# Patient Record
Sex: MALE | HISPANIC OR LATINO | Employment: FULL TIME | URBAN - METROPOLITAN AREA
[De-identification: names, ages, dates, MRNs, and addresses within clinical notes are randomized per-mention and may not be internally consistent; named-entity substitution may affect disease eponyms.]

---

## 2024-10-25 ENCOUNTER — HOSPITAL ENCOUNTER (INPATIENT)
Facility: HOSPITAL | Age: 34
LOS: 2 days | Discharge: HOME | End: 2024-10-27
Attending: INTERNAL MEDICINE | Admitting: INTERNAL MEDICINE

## 2024-10-25 ENCOUNTER — OFFICE VISIT (OUTPATIENT)
Dept: URGENT CARE | Age: 34
End: 2024-10-25

## 2024-10-25 ENCOUNTER — ANCILLARY PROCEDURE (OUTPATIENT)
Dept: URGENT CARE | Age: 34
End: 2024-10-25

## 2024-10-25 ENCOUNTER — APPOINTMENT (OUTPATIENT)
Dept: RADIOLOGY | Facility: HOSPITAL | Age: 34
End: 2024-10-25

## 2024-10-25 ENCOUNTER — APPOINTMENT (OUTPATIENT)
Dept: CARDIOLOGY | Facility: HOSPITAL | Age: 34
End: 2024-10-25

## 2024-10-25 VITALS
HEART RATE: 121 BPM | OXYGEN SATURATION: 92 % | RESPIRATION RATE: 20 BRPM | TEMPERATURE: 99.7 F | DIASTOLIC BLOOD PRESSURE: 71 MMHG | WEIGHT: 120 LBS | SYSTOLIC BLOOD PRESSURE: 98 MMHG

## 2024-10-25 DIAGNOSIS — R05.9 COUGH: ICD-10-CM

## 2024-10-25 DIAGNOSIS — J18.9 MULTIFOCAL PNEUMONIA: Primary | ICD-10-CM

## 2024-10-25 DIAGNOSIS — J18.9 PNEUMONIA OF RIGHT LOWER LOBE DUE TO INFECTIOUS ORGANISM: Primary | ICD-10-CM

## 2024-10-25 DIAGNOSIS — J96.01 ACUTE RESPIRATORY FAILURE WITH HYPOXIA (MULTI): ICD-10-CM

## 2024-10-25 DIAGNOSIS — J18.9 PNEUMONIA OF LEFT UPPER LOBE DUE TO INFECTIOUS ORGANISM: ICD-10-CM

## 2024-10-25 PROBLEM — R00.0 SINUS TACHYCARDIA: Status: ACTIVE | Noted: 2024-10-25

## 2024-10-25 LAB
ALBUMIN SERPL BCP-MCNC: 4.1 G/DL (ref 3.4–5)
ALP SERPL-CCNC: 64 U/L (ref 33–120)
ALT SERPL W P-5'-P-CCNC: 22 U/L (ref 10–52)
ANION GAP SERPL CALCULATED.3IONS-SCNC: 13 MMOL/L (ref 10–20)
AST SERPL W P-5'-P-CCNC: 19 U/L (ref 9–39)
BASOPHILS # BLD AUTO: 0.02 X10*3/UL (ref 0–0.1)
BASOPHILS NFR BLD AUTO: 0.3 %
BILIRUB SERPL-MCNC: 0.4 MG/DL (ref 0–1.2)
BUN SERPL-MCNC: 9 MG/DL (ref 6–23)
CALCIUM SERPL-MCNC: 9.1 MG/DL (ref 8.6–10.3)
CARDIAC TROPONIN I PNL SERPL HS: 3 NG/L (ref 0–20)
CARDIAC TROPONIN I PNL SERPL HS: <3 NG/L (ref 0–20)
CHLORIDE SERPL-SCNC: 99 MMOL/L (ref 98–107)
CO2 SERPL-SCNC: 27 MMOL/L (ref 21–32)
CREAT SERPL-MCNC: 0.89 MG/DL (ref 0.5–1.3)
D DIMER PPP FEU-MCNC: 0.54 MG/L FEU (ref 0.19–0.5)
EGFRCR SERPLBLD CKD-EPI 2021: >90 ML/MIN/1.73M*2
EOSINOPHIL # BLD AUTO: 0.02 X10*3/UL (ref 0–0.7)
EOSINOPHIL NFR BLD AUTO: 0.3 %
ERYTHROCYTE [DISTWIDTH] IN BLOOD BY AUTOMATED COUNT: 11.7 % (ref 11.5–14.5)
FLUAV RNA RESP QL NAA+PROBE: NOT DETECTED
FLUBV RNA RESP QL NAA+PROBE: NOT DETECTED
GLUCOSE SERPL-MCNC: 142 MG/DL (ref 74–99)
HCT VFR BLD AUTO: 41.7 % (ref 41–52)
HGB BLD-MCNC: 14.1 G/DL (ref 13.5–17.5)
IMM GRANULOCYTES # BLD AUTO: 0.02 X10*3/UL (ref 0–0.7)
IMM GRANULOCYTES NFR BLD AUTO: 0.3 % (ref 0–0.9)
LACTATE SERPL-SCNC: 1.1 MMOL/L (ref 0.4–2)
LYMPHOCYTES # BLD AUTO: 1.82 X10*3/UL (ref 1.2–4.8)
LYMPHOCYTES NFR BLD AUTO: 25.4 %
MAGNESIUM SERPL-MCNC: 1.9 MG/DL (ref 1.6–2.4)
MCH RBC QN AUTO: 29 PG (ref 26–34)
MCHC RBC AUTO-ENTMCNC: 33.8 G/DL (ref 32–36)
MCV RBC AUTO: 86 FL (ref 80–100)
MONOCYTES # BLD AUTO: 0.44 X10*3/UL (ref 0.1–1)
MONOCYTES NFR BLD AUTO: 6.1 %
NEUTROPHILS # BLD AUTO: 4.84 X10*3/UL (ref 1.2–7.7)
NEUTROPHILS NFR BLD AUTO: 67.6 %
NRBC BLD-RTO: 0 /100 WBCS (ref 0–0)
PLATELET # BLD AUTO: 286 X10*3/UL (ref 150–450)
POTASSIUM SERPL-SCNC: 3.8 MMOL/L (ref 3.5–5.3)
PROT SERPL-MCNC: 7.6 G/DL (ref 6.4–8.2)
RBC # BLD AUTO: 4.86 X10*6/UL (ref 4.5–5.9)
RSV RNA RESP QL NAA+PROBE: NOT DETECTED
SARS-COV-2 RNA RESP QL NAA+PROBE: NOT DETECTED
SODIUM SERPL-SCNC: 135 MMOL/L (ref 136–145)
WBC # BLD AUTO: 7.2 X10*3/UL (ref 4.4–11.3)

## 2024-10-25 PROCEDURE — 84484 ASSAY OF TROPONIN QUANT: CPT

## 2024-10-25 PROCEDURE — 99222 1ST HOSP IP/OBS MODERATE 55: CPT | Performed by: INTERNAL MEDICINE

## 2024-10-25 PROCEDURE — 96375 TX/PRO/DX INJ NEW DRUG ADDON: CPT

## 2024-10-25 PROCEDURE — 71046 X-RAY EXAM CHEST 2 VIEWS: CPT | Performed by: SURGERY

## 2024-10-25 PROCEDURE — 36415 COLL VENOUS BLD VENIPUNCTURE: CPT

## 2024-10-25 PROCEDURE — 83735 ASSAY OF MAGNESIUM: CPT

## 2024-10-25 PROCEDURE — 99285 EMERGENCY DEPT VISIT HI MDM: CPT | Mod: 25

## 2024-10-25 PROCEDURE — 1200000002 HC GENERAL ROOM WITH TELEMETRY DAILY

## 2024-10-25 PROCEDURE — 85300 ANTITHROMBIN III ACTIVITY: CPT

## 2024-10-25 PROCEDURE — 87040 BLOOD CULTURE FOR BACTERIA: CPT | Mod: TRILAB

## 2024-10-25 PROCEDURE — 87636 SARSCOV2 & INF A&B AMP PRB: CPT

## 2024-10-25 PROCEDURE — 85025 COMPLETE CBC W/AUTO DIFF WBC: CPT

## 2024-10-25 PROCEDURE — 2500000004 HC RX 250 GENERAL PHARMACY W/ HCPCS (ALT 636 FOR OP/ED)

## 2024-10-25 PROCEDURE — 93005 ELECTROCARDIOGRAM TRACING: CPT

## 2024-10-25 PROCEDURE — 87634 RSV DNA/RNA AMP PROBE: CPT | Performed by: INTERNAL MEDICINE

## 2024-10-25 PROCEDURE — 71275 CT ANGIOGRAPHY CHEST: CPT | Performed by: RADIOLOGY

## 2024-10-25 PROCEDURE — 2550000001 HC RX 255 CONTRASTS

## 2024-10-25 PROCEDURE — 96365 THER/PROPH/DIAG IV INF INIT: CPT

## 2024-10-25 PROCEDURE — 93010 ELECTROCARDIOGRAM REPORT: CPT | Performed by: INTERNAL MEDICINE

## 2024-10-25 PROCEDURE — 83605 ASSAY OF LACTIC ACID: CPT

## 2024-10-25 PROCEDURE — 80053 COMPREHEN METABOLIC PANEL: CPT

## 2024-10-25 PROCEDURE — 71275 CT ANGIOGRAPHY CHEST: CPT

## 2024-10-25 RX ORDER — CEFTRIAXONE 2 G/50ML
2 INJECTION, SOLUTION INTRAVENOUS ONCE
Status: COMPLETED | OUTPATIENT
Start: 2024-10-25 | End: 2024-10-25

## 2024-10-25 RX ORDER — IPRATROPIUM BROMIDE AND ALBUTEROL SULFATE 2.5; .5 MG/3ML; MG/3ML
3 SOLUTION RESPIRATORY (INHALATION) EVERY 6 HOURS PRN
Status: DISCONTINUED | OUTPATIENT
Start: 2024-10-25 | End: 2024-10-27 | Stop reason: HOSPADM

## 2024-10-25 ASSESSMENT — PAIN DESCRIPTION - PAIN TYPE: TYPE: ACUTE PAIN

## 2024-10-25 ASSESSMENT — COLUMBIA-SUICIDE SEVERITY RATING SCALE - C-SSRS
2. HAVE YOU ACTUALLY HAD ANY THOUGHTS OF KILLING YOURSELF?: NO
6. HAVE YOU EVER DONE ANYTHING, STARTED TO DO ANYTHING, OR PREPARED TO DO ANYTHING TO END YOUR LIFE?: NO
1. IN THE PAST MONTH, HAVE YOU WISHED YOU WERE DEAD OR WISHED YOU COULD GO TO SLEEP AND NOT WAKE UP?: NO

## 2024-10-25 ASSESSMENT — PAIN DESCRIPTION - LOCATION: LOCATION: CHEST

## 2024-10-25 ASSESSMENT — PAIN - FUNCTIONAL ASSESSMENT: PAIN_FUNCTIONAL_ASSESSMENT: 0-10

## 2024-10-25 ASSESSMENT — PAIN SCALES - GENERAL: PAINLEVEL_OUTOF10: 4

## 2024-10-25 NOTE — PROGRESS NOTES
Chief Complaint   Patient presents with    Cough    Fever    Diarrhea     Cough congestion feels fatigue / daughter has pneumonia        Physical Exam:       GEN: No acute distress    ENT: Bilateral TMs and canals unremarkable, sinus congestion present. Pharynx and tonsils mildly hyperemic but without exudate.     Resp: Lungs clear to auscultation bilaterally         No diagnosis found.     Medical Decision Making & Plan:     Tamanna Hatch, DO

## 2024-10-26 ENCOUNTER — APPOINTMENT (OUTPATIENT)
Dept: RADIOLOGY | Facility: HOSPITAL | Age: 34
End: 2024-10-26

## 2024-10-26 PROBLEM — R59.0 MEDIASTINAL LYMPHADENOPATHY: Status: ACTIVE | Noted: 2024-10-26

## 2024-10-26 PROBLEM — R59.0 HILAR LYMPHADENOPATHY: Status: ACTIVE | Noted: 2024-10-26

## 2024-10-26 PROBLEM — E04.1 THYROID NODULE: Status: ACTIVE | Noted: 2024-10-26

## 2024-10-26 PROBLEM — I77.810 AORTIC ECTASIA, THORACIC (CMS-HCC): Status: ACTIVE | Noted: 2024-10-26

## 2024-10-26 LAB
ALBUMIN SERPL BCP-MCNC: 3.8 G/DL (ref 3.4–5)
ALP SERPL-CCNC: 68 U/L (ref 33–120)
ALT SERPL W P-5'-P-CCNC: 34 U/L (ref 10–52)
ANION GAP SERPL CALCULATED.3IONS-SCNC: 15 MMOL/L (ref 10–20)
APPEARANCE UR: CLEAR
AST SERPL W P-5'-P-CCNC: 34 U/L (ref 9–39)
BASOPHILS # BLD AUTO: 0 X10*3/UL (ref 0–0.1)
BASOPHILS NFR BLD AUTO: 0 %
BILIRUB SERPL-MCNC: 0.3 MG/DL (ref 0–1.2)
BILIRUB UR STRIP.AUTO-MCNC: NEGATIVE MG/DL
BUN SERPL-MCNC: 11 MG/DL (ref 6–23)
CALCIUM SERPL-MCNC: 9.4 MG/DL (ref 8.6–10.3)
CHLORIDE SERPL-SCNC: 103 MMOL/L (ref 98–107)
CO2 SERPL-SCNC: 23 MMOL/L (ref 21–32)
COLOR UR: COLORLESS
CREAT SERPL-MCNC: 0.71 MG/DL (ref 0.5–1.3)
EGFRCR SERPLBLD CKD-EPI 2021: >90 ML/MIN/1.73M*2
EOSINOPHIL # BLD AUTO: 0 X10*3/UL (ref 0–0.7)
EOSINOPHIL NFR BLD AUTO: 0 %
ERYTHROCYTE [DISTWIDTH] IN BLOOD BY AUTOMATED COUNT: 11.8 % (ref 11.5–14.5)
GLUCOSE SERPL-MCNC: 164 MG/DL (ref 74–99)
GLUCOSE UR STRIP.AUTO-MCNC: NORMAL MG/DL
HCT VFR BLD AUTO: 40 % (ref 41–52)
HGB BLD-MCNC: 13.8 G/DL (ref 13.5–17.5)
IMM GRANULOCYTES # BLD AUTO: 0.01 X10*3/UL (ref 0–0.7)
IMM GRANULOCYTES NFR BLD AUTO: 0.2 % (ref 0–0.9)
KETONES UR STRIP.AUTO-MCNC: NEGATIVE MG/DL
LEUKOCYTE ESTERASE UR QL STRIP.AUTO: NEGATIVE
LYMPHOCYTES # BLD AUTO: 0.63 X10*3/UL (ref 1.2–4.8)
LYMPHOCYTES NFR BLD AUTO: 14.8 %
MCH RBC QN AUTO: 29.5 PG (ref 26–34)
MCHC RBC AUTO-ENTMCNC: 34.5 G/DL (ref 32–36)
MCV RBC AUTO: 86 FL (ref 80–100)
MONOCYTES # BLD AUTO: 0.07 X10*3/UL (ref 0.1–1)
MONOCYTES NFR BLD AUTO: 1.6 %
NEUTROPHILS # BLD AUTO: 3.54 X10*3/UL (ref 1.2–7.7)
NEUTROPHILS NFR BLD AUTO: 83.4 %
NITRITE UR QL STRIP.AUTO: NEGATIVE
NRBC BLD-RTO: 0 /100 WBCS (ref 0–0)
PH UR STRIP.AUTO: 6.5 [PH]
PLATELET # BLD AUTO: 279 X10*3/UL (ref 150–450)
POTASSIUM SERPL-SCNC: 4.4 MMOL/L (ref 3.5–5.3)
PROT SERPL-MCNC: 7.5 G/DL (ref 6.4–8.2)
PROT UR STRIP.AUTO-MCNC: NEGATIVE MG/DL
RBC # BLD AUTO: 4.68 X10*6/UL (ref 4.5–5.9)
RBC # UR STRIP.AUTO: NEGATIVE /UL
RBC MORPH BLD: NORMAL
RSV RNA RESP QL NAA+PROBE: NOT DETECTED
SODIUM SERPL-SCNC: 137 MMOL/L (ref 136–145)
SP GR UR STRIP.AUTO: 1.01
T4 FREE SERPL-MCNC: 1 NG/DL (ref 0.61–1.12)
TSH SERPL-ACNC: 0.15 MIU/L (ref 0.44–3.98)
UROBILINOGEN UR STRIP.AUTO-MCNC: NORMAL MG/DL
WBC # BLD AUTO: 4.3 X10*3/UL (ref 4.4–11.3)

## 2024-10-26 PROCEDURE — 99232 SBSQ HOSP IP/OBS MODERATE 35: CPT | Performed by: NURSE PRACTITIONER

## 2024-10-26 PROCEDURE — 80053 COMPREHEN METABOLIC PANEL: CPT | Performed by: INTERNAL MEDICINE

## 2024-10-26 PROCEDURE — 2500000001 HC RX 250 WO HCPCS SELF ADMINISTERED DRUGS (ALT 637 FOR MEDICARE OP): Performed by: INTERNAL MEDICINE

## 2024-10-26 PROCEDURE — 76536 US EXAM OF HEAD AND NECK: CPT | Performed by: RADIOLOGY

## 2024-10-26 PROCEDURE — 99232 SBSQ HOSP IP/OBS MODERATE 35: CPT | Performed by: STUDENT IN AN ORGANIZED HEALTH CARE EDUCATION/TRAINING PROGRAM

## 2024-10-26 PROCEDURE — 87634 RSV DNA/RNA AMP PROBE: CPT

## 2024-10-26 PROCEDURE — 87449 NOS EACH ORGANISM AG IA: CPT | Mod: TRILAB,WESLAB | Performed by: INTERNAL MEDICINE

## 2024-10-26 PROCEDURE — 76536 US EXAM OF HEAD AND NECK: CPT

## 2024-10-26 PROCEDURE — 92610 EVALUATE SWALLOWING FUNCTION: CPT | Mod: GN | Performed by: SPEECH-LANGUAGE PATHOLOGIST

## 2024-10-26 PROCEDURE — 84439 ASSAY OF FREE THYROXINE: CPT | Performed by: INTERNAL MEDICINE

## 2024-10-26 PROCEDURE — 87899 AGENT NOS ASSAY W/OPTIC: CPT | Mod: TRILAB,WESLAB | Performed by: INTERNAL MEDICINE

## 2024-10-26 PROCEDURE — 84443 ASSAY THYROID STIM HORMONE: CPT | Performed by: INTERNAL MEDICINE

## 2024-10-26 PROCEDURE — 2500000004 HC RX 250 GENERAL PHARMACY W/ HCPCS (ALT 636 FOR OP/ED): Performed by: INTERNAL MEDICINE

## 2024-10-26 PROCEDURE — 9420000001 HC RT PATIENT EDUCATION 5 MIN

## 2024-10-26 PROCEDURE — 81003 URINALYSIS AUTO W/O SCOPE: CPT

## 2024-10-26 PROCEDURE — 1200000002 HC GENERAL ROOM WITH TELEMETRY DAILY

## 2024-10-26 PROCEDURE — 85025 COMPLETE CBC W/AUTO DIFF WBC: CPT | Performed by: INTERNAL MEDICINE

## 2024-10-26 PROCEDURE — 87070 CULTURE OTHR SPECIMN AEROBIC: CPT | Mod: TRILAB,WESLAB | Performed by: INTERNAL MEDICINE

## 2024-10-26 RX ORDER — SODIUM CHLORIDE 9 MG/ML
100 INJECTION, SOLUTION INTRAVENOUS CONTINUOUS
Status: ACTIVE | OUTPATIENT
Start: 2024-10-26 | End: 2024-10-26

## 2024-10-26 RX ORDER — ENOXAPARIN SODIUM 100 MG/ML
40 INJECTION SUBCUTANEOUS DAILY
Status: DISCONTINUED | OUTPATIENT
Start: 2024-10-26 | End: 2024-10-27 | Stop reason: HOSPADM

## 2024-10-26 RX ORDER — ONDANSETRON HYDROCHLORIDE 2 MG/ML
4 INJECTION, SOLUTION INTRAVENOUS EVERY 8 HOURS PRN
Status: DISCONTINUED | OUTPATIENT
Start: 2024-10-26 | End: 2024-10-27 | Stop reason: HOSPADM

## 2024-10-26 RX ORDER — ACETAMINOPHEN 160 MG/5ML
650 SOLUTION ORAL EVERY 4 HOURS PRN
Status: DISCONTINUED | OUTPATIENT
Start: 2024-10-26 | End: 2024-10-27 | Stop reason: HOSPADM

## 2024-10-26 RX ORDER — GUAIFENESIN/DEXTROMETHORPHAN 100-10MG/5
5 SYRUP ORAL EVERY 4 HOURS PRN
Status: DISCONTINUED | OUTPATIENT
Start: 2024-10-26 | End: 2024-10-27 | Stop reason: HOSPADM

## 2024-10-26 RX ORDER — ONDANSETRON 4 MG/1
4 TABLET, ORALLY DISINTEGRATING ORAL EVERY 8 HOURS PRN
Status: DISCONTINUED | OUTPATIENT
Start: 2024-10-26 | End: 2024-10-27 | Stop reason: HOSPADM

## 2024-10-26 RX ORDER — GUAIFENESIN 600 MG/1
600 TABLET, EXTENDED RELEASE ORAL EVERY 12 HOURS PRN
Status: DISCONTINUED | OUTPATIENT
Start: 2024-10-26 | End: 2024-10-27 | Stop reason: HOSPADM

## 2024-10-26 RX ORDER — ACETAMINOPHEN 325 MG/1
650 TABLET ORAL EVERY 4 HOURS PRN
Status: DISCONTINUED | OUTPATIENT
Start: 2024-10-26 | End: 2024-10-27 | Stop reason: HOSPADM

## 2024-10-26 RX ORDER — FAMOTIDINE 10 MG/ML
20 INJECTION INTRAVENOUS ONCE
Status: COMPLETED | OUTPATIENT
Start: 2024-10-26 | End: 2024-10-26

## 2024-10-26 RX ORDER — POLYETHYLENE GLYCOL 3350 17 G/17G
17 POWDER, FOR SOLUTION ORAL DAILY PRN
Status: DISCONTINUED | OUTPATIENT
Start: 2024-10-26 | End: 2024-10-27 | Stop reason: HOSPADM

## 2024-10-26 RX ORDER — ACETAMINOPHEN 650 MG/1
650 SUPPOSITORY RECTAL EVERY 4 HOURS PRN
Status: DISCONTINUED | OUTPATIENT
Start: 2024-10-26 | End: 2024-10-27 | Stop reason: HOSPADM

## 2024-10-26 SDOH — SOCIAL STABILITY: SOCIAL INSECURITY
WITHIN THE LAST YEAR, HAVE YOU BEEN RAPED OR FORCED TO HAVE ANY KIND OF SEXUAL ACTIVITY BY YOUR PARTNER OR EX-PARTNER?: NO

## 2024-10-26 SDOH — ECONOMIC STABILITY: HOUSING INSECURITY: IN THE LAST 12 MONTHS, WAS THERE A TIME WHEN YOU WERE NOT ABLE TO PAY THE MORTGAGE OR RENT ON TIME?: NO

## 2024-10-26 SDOH — SOCIAL STABILITY: SOCIAL INSECURITY: ABUSE: ADULT

## 2024-10-26 SDOH — ECONOMIC STABILITY: FOOD INSECURITY: HOW HARD IS IT FOR YOU TO PAY FOR THE VERY BASICS LIKE FOOD, HOUSING, MEDICAL CARE, AND HEATING?: NOT VERY HARD

## 2024-10-26 SDOH — SOCIAL STABILITY: SOCIAL INSECURITY: WITHIN THE LAST YEAR, HAVE YOU BEEN AFRAID OF YOUR PARTNER OR EX-PARTNER?: NO

## 2024-10-26 SDOH — ECONOMIC STABILITY: FOOD INSECURITY: WITHIN THE PAST 12 MONTHS, THE FOOD YOU BOUGHT JUST DIDN'T LAST AND YOU DIDN'T HAVE MONEY TO GET MORE.: PATIENT DECLINED

## 2024-10-26 SDOH — ECONOMIC STABILITY: INCOME INSECURITY
IN THE PAST 12 MONTHS HAS THE ELECTRIC, GAS, OIL, OR WATER COMPANY THREATENED TO SHUT OFF SERVICES IN YOUR HOME?: PATIENT DECLINED

## 2024-10-26 SDOH — SOCIAL STABILITY: SOCIAL INSECURITY: WITHIN THE LAST YEAR, HAVE YOU BEEN HUMILIATED OR EMOTIONALLY ABUSED IN OTHER WAYS BY YOUR PARTNER OR EX-PARTNER?: NO

## 2024-10-26 SDOH — ECONOMIC STABILITY: HOUSING INSECURITY: AT ANY TIME IN THE PAST 12 MONTHS, WERE YOU HOMELESS OR LIVING IN A SHELTER (INCLUDING NOW)?: NO

## 2024-10-26 SDOH — SOCIAL STABILITY: SOCIAL INSECURITY
WITHIN THE LAST YEAR, HAVE YOU BEEN KICKED, HIT, SLAPPED, OR OTHERWISE PHYSICALLY HURT BY YOUR PARTNER OR EX-PARTNER?: NO

## 2024-10-26 SDOH — ECONOMIC STABILITY: TRANSPORTATION INSECURITY: IN THE PAST 12 MONTHS, HAS LACK OF TRANSPORTATION KEPT YOU FROM MEDICAL APPOINTMENTS OR FROM GETTING MEDICATIONS?: NO

## 2024-10-26 SDOH — ECONOMIC STABILITY: FOOD INSECURITY
WITHIN THE PAST 12 MONTHS, YOU WORRIED THAT YOUR FOOD WOULD RUN OUT BEFORE YOU GOT THE MONEY TO BUY MORE.: PATIENT DECLINED

## 2024-10-26 SDOH — ECONOMIC STABILITY: HOUSING INSECURITY: IN THE PAST 12 MONTHS, HOW MANY TIMES HAVE YOU MOVED WHERE YOU WERE LIVING?: 0

## 2024-10-26 SDOH — SOCIAL STABILITY: SOCIAL INSECURITY: HAVE YOU HAD THOUGHTS OF HARMING ANYONE ELSE?: NO

## 2024-10-26 ASSESSMENT — COGNITIVE AND FUNCTIONAL STATUS - GENERAL
MOBILITY SCORE: 24
DAILY ACTIVITIY SCORE: 24
DAILY ACTIVITIY SCORE: 24
PATIENT BASELINE BEDBOUND: NO
MOBILITY SCORE: 24
DAILY ACTIVITIY SCORE: 24
MOBILITY SCORE: 24

## 2024-10-26 ASSESSMENT — ENCOUNTER SYMPTOMS
ENDOCRINE NEGATIVE: 1
SHORTNESS OF BREATH: 1
ALLERGIC/IMMUNOLOGIC NEGATIVE: 1
EYES NEGATIVE: 1
COUGH: 1
PSYCHIATRIC NEGATIVE: 1
HEMATOLOGIC/LYMPHATIC NEGATIVE: 1
MUSCULOSKELETAL NEGATIVE: 1
FATIGUE: 1
WEAKNESS: 1

## 2024-10-26 ASSESSMENT — ACTIVITIES OF DAILY LIVING (ADL)
HEARING - LEFT EAR: FUNCTIONAL
DRESSING YOURSELF: INDEPENDENT
BATHING: INDEPENDENT
JUDGMENT_ADEQUATE_SAFELY_COMPLETE_DAILY_ACTIVITIES: YES
LACK_OF_TRANSPORTATION: NO
LACK_OF_TRANSPORTATION: PATIENT DECLINED
FEEDING YOURSELF: INDEPENDENT
TOILETING: INDEPENDENT
HEARING - RIGHT EAR: FUNCTIONAL
GROOMING: INDEPENDENT
WALKS IN HOME: INDEPENDENT
ADEQUATE_TO_COMPLETE_ADL: YES
PATIENT'S MEMORY ADEQUATE TO SAFELY COMPLETE DAILY ACTIVITIES?: YES

## 2024-10-26 ASSESSMENT — LIFESTYLE VARIABLES
HOW OFTEN DO YOU HAVE 6 OR MORE DRINKS ON ONE OCCASION: NEVER
HOW MANY STANDARD DRINKS CONTAINING ALCOHOL DO YOU HAVE ON A TYPICAL DAY: PATIENT DOES NOT DRINK
AUDIT-C TOTAL SCORE: 0
AUDIT-C TOTAL SCORE: 0
SKIP TO QUESTIONS 9-10: 1
HOW OFTEN DO YOU HAVE A DRINK CONTAINING ALCOHOL: NEVER

## 2024-10-26 ASSESSMENT — PAIN SCALES - GENERAL
PAINLEVEL_OUTOF10: 0 - NO PAIN

## 2024-10-26 ASSESSMENT — PATIENT HEALTH QUESTIONNAIRE - PHQ9
1. LITTLE INTEREST OR PLEASURE IN DOING THINGS: NOT AT ALL
2. FEELING DOWN, DEPRESSED OR HOPELESS: NOT AT ALL
SUM OF ALL RESPONSES TO PHQ9 QUESTIONS 1 & 2: 0

## 2024-10-26 ASSESSMENT — PAIN - FUNCTIONAL ASSESSMENT
PAIN_FUNCTIONAL_ASSESSMENT: 0-10

## 2024-10-26 NOTE — ASSESSMENT & PLAN NOTE
Likely secondary to dehydration from diarrhea, resolved status post 500 cc IV bolus in the emergency department.

## 2024-10-26 NOTE — PROGRESS NOTES
Speech-Language Pathology    Speech-Language Pathology Clinical Swallow Evaluation    Patient Name: Diogo Carrizales  MRN: [id number]  : 1990  Today's Date: 10/26/24  Start Time: 1020  Stop Time: 1042  Time Calculation (min): 22 min      ASSESSMENT  Impressions:  No clinical s/s of oropharyngeal dysphagia present at this time.       PLAN  Recommendations:  Is MBSS recommended? No; pt appears to be at or near functional baseline.  Solid consistency: Regular (IDDSI level 7)  Liquid consistency: Thin (IDDSI 0)  Medication administration: Whole, in thin liquid  Compensatory swallow strategies:  - Upright positioning for all PO intake  - Remain upright for >30 min after meals  - Slow rate of intake  - If patient is SOB, discontinue PO intake until resolved    Recommended frequency/duration:  Skilled SLP services recommended: No  Discharge recommendation: Home with no further SLP     Strengths: Cognition and Family/caregiver support  Barriers to participation in tx: N/A      SUBJECTIVE    PMHx relevant to rehab: Principal Problem:    Acute hypoxic respiratory failure (Multi)  Active Problems:    Multifocal pneumonia    Sinus tachycardia    Mediastinal lymphadenopathy    Hilar lymphadenopathy    Thyroid nodule    Aortic ectasia, thoracic (CMS-HCC)      Chief complaint: Pt was admitted on 10/25/24 due to:  Chief Complaint   Patient presents with    Shortness of Breath     Patient has been experiencing SOB for a week and couple days now, patient was feeling worse and checked into urgent care , had an SPO2 of 88% on RA.    . He was found to have Acute hypoxic respiratory failure (Multi).    Relevant imaging results:  CXR 10/25  IMPRESSION:  1. Findings of pneumonia of the right lung base as well as left upper  lung suprahilar region.    General Visit Information:  SLP Received On: 10/26/24  Patient Class: Inpatient  Living Environment: Home  Ordering Physician: Erik Aquino MD  Reason for Referral: Patient referred  "to ST for swallow evaluation due to concerns for aspiration in the setting of PNA  Prior to Session Communication: Bedside nurse    RN cleared pt to participate in session and reported no concerns regarding swallow function.    Pt is [place] speaking. Pt reported,\" Yesterday I was coughing more when I ate\". Patients family present at bedside providing translation as needed.    Date of Onset: 10/25/24  Date of Order: 10/25/24  BaseLine Diet: Reg/thin  Current Diet : Reg/thin    Status at time of evaluation:  Pain Assessment  Pain Assessment: 0-10  0-10 (Numeric) Pain Score: 0 - No pain  Pain Type: Acute pain  Pain Location: Chest    Pt was alert, pleasant, and cooperative for session.  Orientation: Ox4  Ability to follow functional commands: WFL  Nutritional status: Appears well-nourished/no concerns    Respiratory status: Supplemental oxygen via NC  Baseline Vocal Quality: Normal  Volitional Cough: Strong  Volitional Swallow: Within Functional Limits  Patient positioning: Upright in bed      OBJECTIVE  Clinical swallow evaluation completed and consisted of interview, OME, CNE, and diagnostic PO trials (thin liquids (3 oz water successively swallowed via cup) and 1 feliz cracker).    OME and CNE grossly WFL. Natural dentition in good condition. Scattered scabbed sores around mouth.  Oral mucosa were pink, moist, and free of obvious lesions.  Vocal quality and cough strength perceptually WFL. Timely and functional mastication, bolus manipulation and oral clearance. Laryngeal elevation was visualized and/or palpated across all trials, however adequacy of hyolaryngeal elevation/excursion cannot be determined at bedside. No immediate or delayed s/s of aspiration observed throughout all trials/consistencies presented. No clinical s/s of oropharyngeal dysphagia present at this time.     Was 3oz challenge administered: Yes; pt drank 3oz of thin liquid in one attempt, without breaking, with no overt s/sx " aspiration/penetration observed.      Treatment/Education:  Results and recommendations were relayed to: Patient, Family, and Bedside nurse  Education provided: Yes   Learner: Patient and Family   Barriers to learning: None   Method of teaching: Verbal   Topic: role of ST, results of assessment, and recommended safe swallow strategies   Outcome of teaching: Pt/family demonstrated good understanding  Treatment provided: No

## 2024-10-26 NOTE — CARE PLAN
Problem: Safety - Adult  Goal: Free from fall injury  Outcome: Progressing     Problem: Discharge Planning  Goal: Discharge to home or other facility with appropriate resources  Outcome: Progressing     Problem: Chronic Conditions and Co-morbidities  Goal: Patient's chronic conditions and co-morbidity symptoms are monitored and maintained or improved  Outcome: Progressing     Problem: Pain  Goal: Takes deep breaths with improved pain control throughout the shift  Outcome: Progressing  Goal: Turns in bed with improved pain control throughout the shift  Outcome: Progressing  Goal: Walks with improved pain control throughout the shift  Outcome: Progressing  Goal: Performs ADL's with improved pain control throughout shift  Outcome: Progressing  Goal: Participates in PT with improved pain control throughout the shift  Outcome: Progressing  Goal: Free from opioid side effects throughout the shift  Outcome: Progressing  Goal: Free from acute confusion related to pain meds throughout the shift  Outcome: Progressing   The patient's goals for the shift include Rest    The clinical goals for the shift include Monitor oxygen demand, monitor vitals, monitor labs    Over the shift, the patient did not make progress toward the following goals. Barriers to progression include . Recommendations to address these barriers include .

## 2024-10-26 NOTE — CARE PLAN
Problem: Safety - Adult  Goal: Free from fall injury  Outcome: Progressing     Problem: Discharge Planning  Goal: Discharge to home or other facility with appropriate resources  Outcome: Progressing     Problem: Chronic Conditions and Co-morbidities  Goal: Patient's chronic conditions and co-morbidity symptoms are monitored and maintained or improved  Outcome: Progressing     Problem: Pain  Goal: Takes deep breaths with improved pain control throughout the shift  Outcome: Progressing  Goal: Turns in bed with improved pain control throughout the shift  Outcome: Progressing  Goal: Walks with improved pain control throughout the shift  Outcome: Progressing  Goal: Performs ADL's with improved pain control throughout shift  Outcome: Progressing  Goal: Free from opioid side effects throughout the shift  Outcome: Progressing  Goal: Free from acute confusion related to pain meds throughout the shift  Outcome: Progressing   The patient's goals for the shift include Rest    The clinical goals for the shift include Monitor oxygen demand, monitor vitals, monitor labs      10/26/24 at 2:48 AM - Marylin Stephenson RN

## 2024-10-26 NOTE — H&P
History Of Present Illness      Diogo Carrizales is a 34 y.o. male presenting with initially to the urgent care facility with chief complaint of cough and associated fever and diarrhea.  Patient stated that he felt fatigued.  He stated that his daughter has pneumonia.  Patient subsequently presented to [facility] ED with chief complaint of shortness of breath.    Is been experiencing symptoms for over 1 week now.  At the urgent care facility his pulse oximetry was noted as 88% on room air.    Feels as if his heart is racing. Patient denies fevers, chills, cough, sore throat, runny nose, chest pain, abdominal pain, nausea, vomiting, diarrhea or urinary complaints.     Upon arrival to our hospital his vital signs were noted for Tmax 37.9, pulse rate 128, respiratory rate 24, /85.  Patient was saturating 97% on supplemental oxygen by nasal cannula at 2 to 3 L.    In the ED the patient was administered empiric IV antibiotics.  He was given IV azithromycin and IV Rocephin.  He was given a 500 cc normal saline bolus.    His ED diagnostic workup was noted for a completely normal CBC with differential.  Blood chemistry was essentially unremarkable aside from a glucose of 142 and a sodium level of 135.  Cardiac enzyme level was obtained and was undetectable.  X 2.  Actiq acid level was normal at 1.1.  His electrolyte levels were within normal limits.  D-dimer was elevated at 0.54.  Blood culture was obtained in the ED.  Patient's rapid influenza/RSV and coronavirus PCR testing were pan negative.    Patient underwent a chest x-ray two-view in the ED. this was read as findings of pneumonia of the right lung base as well as the left upper lung suprahilar region.    A CT angiogram of the chest with IV contrast was obtained.  The results are noted for the following:      IMPRESSION:  1. No central pulmonary emboli. Subsegmental pulmonary arteries are  suboptimally evaluated to suboptimal opacification.  2. Extensive  consolidative and peribronchovascular ground-glass  opacities most pronounced within the right greater than left lung  bases and to a lesser degree the left-greater-than-right upper lobes.  The findings are concerning for multifocal pneumonia. Endobronchial  debris raises the possibility of aspiration.  3. Mediastinal and hilar lymph node enlargement, likely reactive.  4. 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  5.  1.7 cm right thyroid nodule. Follow-up thyroid ultrasound is  recommended.         Past Medical History      None       Surgical History        No past surgical history on file.       Social History    He reports that he has never smoked. He has never used smokeless tobacco. He reports that he does not use drugs. No history on file for alcohol use.    He drinks beer socially       Family History      Mother: Alzheimer's dementia  Father: CAD       Allergies      Patient has no known allergies.        Review of Systems    14-point ROS otherwise negative, as per HPI/Interval History.    General: No change in weight. No weakenss/Fatigue. No Fevers/Chills/Night Sweats   Skin: No skin/hair/nail changes. No rashes or sores.  Head:  No trauma. No Headache/nasuea/vomitting.   Eyes: No visual changes. No tearing. No itching.   Ears: No hearing loss. No tinnitus. No vertigo. No discharge.  Nose, Sinuses: No rhinorrhea, No nasal congestion. No epistaxis.  Mouth, Throat, Neck: No bleeding gums, hoarseness, sore throat or swollen neck  Cardiac: No palpitations. No SOLORIO. No PND. No Orthopnea.   Respiratory: YEs Shortness of Breath. No wheezing. No cough. No hemoptysis.   GI: No nausea/vomiting. No indigestion. YEs diarrhea. No constipation.   Extremities: No numbness or tingling. No paresthesias.   Urinary: No change in urinary frequency. No change in hesitancy. No hematuria. No incontinence.         Physical Exam        Constitutional:  Pleasant. Dehydrated.   Eyes: PERRL, EOMI,   ENMT: mucous  "membranes moist  Head/Neck: Neck supple, No JVD,   Respiratory/Thorax: Patent airways, CTAB,   Cardiovascular: Sinus tachycardia, no murmurs  Gastrointestinal: Soft, non-distended, +BS.  Musculoskeletal: ROM intact, no joint swelling, normal strength  Extremities: peripheral pulses intact; no edema  Neurological: Alert and Oriented x 3; no focal deficits; gross motor and sensation intact; CN II-XII intact. No asterixis.  Psychological: Appropriate mood and behavior  Skin: No lesions, No rashes.         Last Recorded Vitals  Blood pressure 123/81, pulse (!) 101, temperature 37.9 °C (100.2 °F), temperature source Temporal, resp. rate (!) 24, height 1.753 m (5' 9\"), weight 54.4 kg (119 lb 14.9 oz), SpO2 97%.    Relevant Results    Lab Results   Component Value Date    WBC 7.2 10/25/2024    HGB 14.1 10/25/2024    HCT 41.7 10/25/2024    MCV 86 10/25/2024     10/25/2024       Lab Results   Component Value Date    GLUCOSE 142 (H) 10/25/2024    CALCIUM 9.1 10/25/2024     (L) 10/25/2024    K 3.8 10/25/2024    CO2 27 10/25/2024    CL 99 10/25/2024    BUN 9 10/25/2024    CREATININE 0.89 10/25/2024       No results found for: \"HGBA1C\"      No CT head results found for the past 12 months      Scheduled medications  doxycycline, 100 mg, intravenous, q12h  methylPREDNISolone sodium succinate (PF), 40 mg, intravenous, q12h  piperacillin-tazobactam, 3.375 g, intravenous, q6h      Continuous medications  sodium chloride 0.9%, 100 mL/hr      PRN medications  PRN medications: acetaminophen **OR** acetaminophen **OR** acetaminophen, benzocaine-menthol, dextromethorphan-guaifenesin, guaiFENesin, ipratropium-albuteroL, ondansetron ODT **OR** ondansetron, polyethylene glycol        Assessment/Plan   Principal Problem:    Acute hypoxic respiratory failure (Multi)  Active Problems:    Multifocal pneumonia    Sinus tachycardia    Mediastinal lymphadenopathy    Hilar lymphadenopathy    Thyroid nodule    Aortic ectasia, thoracic " (CMS-[facility])          Diogo Carrizales is a 34 y.o. male presenting with initially to the urgent care facility with chief complaint of cough and associated fever and diarrhea.  Patient stated that he felt fatigued.  He stated that his daughter has pneumonia.          Acute Hypoxic Respiratory Failure    Admit to telemetry unit  Vital signs per protocol  Following the setting of bilateral multifocal pneumonia  Continue empiric IV antibiotics  Follow-up sputum and blood cultures  DuoNebs as needed  Low-dose IV corticosteroids to a hasten recovery  Pulmonary consultation requested  Will add on RSV rapid PCR  Follow-up streptococcal urine antigen and Legionella urine antigen levels  CT angiogram of the chest appreciated  Repeat imaging as outpatient to assess for resolution      Bilateral Multifocal Pneumonia    Management as above      Sinus Tachycardia    Most probably secondary to above and in the setting of dehydration      Mediastinal/Hilar Lymphadenopathy    Was probably reactive to above  Repeat imaging as outpatient to assess for resolution  Defer to PCP      Incidental Thyroid Nodule    Follow-up TSH level in the a.m.  Will obtain a thyroid ultrasound  Patient may require referral to endocrinology at discharge      Incidental Ectasia of Ascending Thoracic Aorta    Patient to obtain CT surveillance imaging as outpatient  Defer to patient's PCP      DVT prophylaxis    Lovenox subcu            The patient has been Instructed by me upon admission to follow-up with their PCP upon discharge to obtain repeat blood work/CXR and to maintain close medical follow-up for their current disease process.          This Dictation was Transcribed using a Nuance Dragon Voice Recognition System Device (with Compatible Computer + Software) and as such may contain Grammatical Errors and Unintentional Typing Misprints.      I spent 35 minutes in the professional and overall care of this patient.      Erik Aquino MD

## 2024-10-26 NOTE — ASSESSMENT & PLAN NOTE
-Secondary to bilateral multifocal pneumonia,   -Continue oxygen via nasal cannula currently on 2.5 L  -Sinew IV antibiotics and IV corticosteroids  -Pulmonary medicine consulted

## 2024-10-26 NOTE — PROGRESS NOTES
Chief Complaint   Patient presents with    Cough    Fever    Diarrhea     Cough congestion feels fatigue / daughter has pneumonia        Physical Exam:     GEN: No acute distress    ENT: Bilateral TMs and canals unremarkable, sinus congestion present. Pharynx and tonsils mildly hyperemic but without exudate.     Resp: Lungs clear to auscultation bilaterally       Imaging:       === 10/25/24 ===    XR CHEST 2 VIEWS    - Impression -  1. Findings of pneumonia of the right lung base as well as left upper  lung suprahilar region.    MACRO:  None.    Signed by: Chantal Sterling 10/25/2024 8:08 PM  Dictation workstation:   [id number]    Assessment:     Encounter Diagnoses   Name Primary?    Pneumonia of right lower lobe due to infectious organism Yes    Pneumonia of left upper lobe due to infectious organism           Medical Decision Making & Plan:   Patient with O2 saturation of 88% after DuoNeb, patient with increased work of breathing. The sqad was called to transfer to [facility].       Tamanna Hatch, DO

## 2024-10-26 NOTE — PROGRESS NOTES
Diogo Carrizales is a 34 y.o. male on day 1 of admission presenting with Acute hypoxic respiratory failure (Multi).      Subjective   10/26/2024, patient seen and evaluated bedside.  Wife present during evaluation.  Patient reports improved shortness of breath.  He did complain of some chest discomfort but his troponin levels were okay.  EKG was okay.  He reports he is a non-smoker and works as a .       Objective     Last Recorded Vitals  /63 (BP Location: Right arm, Patient Position: Lying)   Pulse 90   Temp 36.2 °C (97.2 °F) (Temporal)   Resp 18   Wt 54.4 kg (119 lb 14.9 oz)   SpO2 95%   Intake/Output last 3 Shifts:    Intake/Output Summary (Last 24 hours) at 10/26/2024 1039  Last data filed at 10/26/2024 0706  Gross per 24 hour   Intake 1233.34 ml   Output --   Net 1233.34 ml       Admission Weight  Weight: 54.4 kg (119 lb 14.9 oz) (10/25/24 2015)    Daily Weight  10/25/24 : 54.4 kg (119 lb 14.9 oz)    Image Results  CT angio chest for pulmonary embolism  Narrative: Interpreted By:  Andrew Leos,   STUDY:  CT ANGIO CHEST FOR PULMONARY EMBOLISM;  10/25/2024 10:29 pm      INDICATION:  Signs/Symptoms:tachycardia, hypoxia, elevated dimer.      COMPARISON:  And a left hilar lymph node measures 1.3 cm short axis.      ACCESSION NUMBER(S):  [id number]      ORDERING CLINICIAN:  SAVANNAH ARIAS      TECHNIQUE:  Helical data acquisition of the chest was obtained after the  administration of intravenous contrast, 75 mL Omnipaque 350. Images  were reformatted in axial, coronal, and sagittal planes.  MIP  reconstructions were performed on a separate workstation and provided  for interpretation.      FINDINGS:  LOWER NECK AND CHEST WALL:  1.7 cm right thyroid nodule. Mild  gynecomastia.      MEDIASTINUM/TAINA:Mediastinal and hilar lymph node enlargement, for  example a left lower paratracheal lymph node measures 1.5 cm short  axis, and a right hilar lymph node measures 1.3 cm short  axis.  Esophagus is within normal limits.      CARDIOVASCULAR:  Cardiac chamber size within normal limits. No  pericardial effusion. Ectasia of the ascending thoracic aorta  measuring 4 cm. Normal caliber of main pulmonary artery.No central  pulmonary emboli. Subsegmental pulmonary arteries are suboptimally  evaluated to suboptimal opacification.      LUNGS, AIRWAYS, AND PLEURA:  Extensive consolidative and  peribronchovascular ground-glass opacities most pronounced within the  right greater than left lung bases and to a lesser degree the  left-greater-than-right upper lobes. Scattered endobronchial  material. Motion artifact limits evaluation of the lungs. The trachea  and central airways are patent.      MUSCULOSKELETAL: No acute osseous abnormality or suspicious osseous  lesions.      UPPER ABDOMEN: Unremarkable.      Impression: 1. No central pulmonary emboli. Subsegmental pulmonary arteries are  suboptimally evaluated to suboptimal opacification.  2. Extensive consolidative and peribronchovascular ground-glass  opacities most pronounced within the right greater than left lung  bases and to a lesser degree the left-greater-than-right upper lobes.  The findings are concerning for multifocal pneumonia. Endobronchial  debris raises the possibility of aspiration.  3. Mediastinal and hilar lymph node enlargement, likely reactive.  4. 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  5.  1.7 cm right thyroid nodule. Follow-up thyroid ultrasound is  recommended.      Signed by: Andrew Leos 10/25/2024 11:36 PM  Dictation workstation:   [id number]  XR chest 2 views  Narrative: Interpreted By:  Chantal Sterling,   STUDY:  Chest, 2 views.      INDICATION:  Signs/Symptoms:cough, fever.      COMPARISON:  None.      ACCESSION NUMBER(S):  [id number]      ORDERING CLINICIAN:  JAIDEN HECK      FINDINGS:  The cardiomediastinal silhouette size is within normal limits.      Patchy opacities are visualized in the  right lower lung zone as well  as in the left upper lung zone in the suprahilar region.      No sizeable pleural effusion.      Impression: 1. Findings of pneumonia of the right lung base as well as left upper  lung suprahilar region.      MACRO:  None.      Signed by: Chantal Sterling 10/25/2024 8:08 PM  Dictation workstation:   [id number]      Physical Exam  Constitutional:       Appearance: Normal appearance.   HENT:      Head: Normocephalic and atraumatic.      Nose: Nose normal.      Mouth/Throat:      Mouth: Mucous membranes are moist.   Eyes:      Extraocular Movements: Extraocular movements intact.   Cardiovascular:      Rate and Rhythm: Normal rate and regular rhythm.      Pulses: Normal pulses.      Heart sounds: No murmur heard.     No gallop.   Pulmonary:      Effort: Pulmonary effort is normal. No respiratory distress.      Breath sounds: No wheezing or rales.      Comments: Decreased lung sounds on the right lower lung field.  Abdominal:      General: Bowel sounds are normal. There is no distension.      Palpations: Abdomen is soft.      Tenderness: There is no abdominal tenderness. There is no guarding.   Musculoskeletal:      Cervical back: Normal range of motion and neck supple.      Right lower leg: No edema.      Left lower leg: No edema.   Skin:     General: Skin is warm and dry.   Neurological:      General: No focal deficit present.      Mental Status: He is alert and oriented to person, place, and time. Mental status is at baseline.      Motor: No weakness.   Psychiatric:         Mood and Affect: Mood normal.         Behavior: Behavior normal.         Relevant Results  Scheduled medications  doxycycline, 100 mg, intravenous, q12h  enoxaparin, 40 mg, subcutaneous, Daily  methylPREDNISolone sodium succinate (PF), 40 mg, intravenous, q12h  piperacillin-tazobactam, 3.375 g, intravenous, q6h      Continuous medications  sodium chloride 0.9%, 100 mL/hr, Last Rate: 100 mL/hr (10/26/24 0706)      PRN  medications  PRN medications: acetaminophen **OR** acetaminophen **OR** acetaminophen, benzocaine-menthol, dextromethorphan-guaifenesin, guaiFENesin, ipratropium-albuteroL, ondansetron ODT **OR** ondansetron, polyethylene glycol  Results for orders placed or performed during the hospital encounter of 10/25/24 (from the past 24 hours)   CBC and Auto Differential   Result Value Ref Range    WBC 7.2 4.4 - 11.3 x10*3/uL    nRBC 0.0 0.0 - 0.0 /100 WBCs    RBC 4.86 4.50 - 5.90 x10*6/uL    Hemoglobin 14.1 13.5 - 17.5 g/dL    Hematocrit 41.7 41.0 - 52.0 %    MCV 86 80 - 100 fL    MCH 29.0 26.0 - 34.0 pg    MCHC 33.8 32.0 - 36.0 g/dL    RDW 11.7 11.5 - 14.5 %    Platelets 286 150 - 450 x10*3/uL    Neutrophils % 67.6 40.0 - 80.0 %    Immature Granulocytes %, Automated 0.3 0.0 - 0.9 %    Lymphocytes % 25.4 13.0 - 44.0 %    Monocytes % 6.1 2.0 - 10.0 %    Eosinophils % 0.3 0.0 - 6.0 %    Basophils % 0.3 0.0 - 2.0 %    Neutrophils Absolute 4.84 1.20 - 7.70 x10*3/uL    Immature Granulocytes Absolute, Automated 0.02 0.00 - 0.70 x10*3/uL    Lymphocytes Absolute 1.82 1.20 - 4.80 x10*3/uL    Monocytes Absolute 0.44 0.10 - 1.00 x10*3/uL    Eosinophils Absolute 0.02 0.00 - 0.70 x10*3/uL    Basophils Absolute 0.02 0.00 - 0.10 x10*3/uL   Comprehensive Metabolic Panel   Result Value Ref Range    Glucose 142 (H) 74 - 99 mg/dL    Sodium 135 (L) 136 - 145 mmol/L    Potassium 3.8 3.5 - 5.3 mmol/L    Chloride 99 98 - 107 mmol/L    Bicarbonate 27 21 - 32 mmol/L    Anion Gap 13 10 - 20 mmol/L    Urea Nitrogen 9 6 - 23 mg/dL    Creatinine 0.89 0.50 - 1.30 mg/dL    eGFR >90 >60 mL/min/1.73m*2    Calcium 9.1 8.6 - 10.3 mg/dL    Albumin 4.1 3.4 - 5.0 g/dL    Alkaline Phosphatase 64 33 - 120 U/L    Total Protein 7.6 6.4 - 8.2 g/dL    AST 19 9 - 39 U/L    Bilirubin, Total 0.4 0.0 - 1.2 mg/dL    ALT 22 10 - 52 U/L   Magnesium   Result Value Ref Range    Magnesium 1.90 1.60 - 2.40 mg/dL   D-dimer, quantitative   Result Value Ref Range    D-Dimer Non  VTE, Quant (mg/L FEU) 0.54 (H) 0.19 - 0.50 mg/L FEU   Troponin I, High Sensitivity, Initial   Result Value Ref Range    Troponin I, High Sensitivity 3 0 - 20 ng/L   Sars-CoV-2 PCR   Result Value Ref Range    Coronavirus 2019, PCR Not Detected Not Detected   Influenza A, and B PCR   Result Value Ref Range    Flu A Result Not Detected Not Detected    Flu B Result Not Detected Not Detected   RSV PCR   Result Value Ref Range    RSV PCR Not Detected Not Detected   Lactate   Result Value Ref Range    Lactate 1.1 0.4 - 2.0 mmol/L   Troponin, High Sensitivity, 1 Hour   Result Value Ref Range    Troponin I, High Sensitivity <3 0 - 20 ng/L   CBC and Auto Differential   Result Value Ref Range    WBC 4.3 (L) 4.4 - 11.3 x10*3/uL    nRBC 0.0 0.0 - 0.0 /100 WBCs    RBC 4.68 4.50 - 5.90 x10*6/uL    Hemoglobin 13.8 13.5 - 17.5 g/dL    Hematocrit 40.0 (L) 41.0 - 52.0 %    MCV 86 80 - 100 fL    MCH 29.5 26.0 - 34.0 pg    MCHC 34.5 32.0 - 36.0 g/dL    RDW 11.8 11.5 - 14.5 %    Platelets 279 150 - 450 x10*3/uL    Neutrophils % 83.4 40.0 - 80.0 %    Immature Granulocytes %, Automated 0.2 0.0 - 0.9 %    Lymphocytes % 14.8 13.0 - 44.0 %    Monocytes % 1.6 2.0 - 10.0 %    Eosinophils % 0.0 0.0 - 6.0 %    Basophils % 0.0 0.0 - 2.0 %    Neutrophils Absolute 3.54 1.20 - 7.70 x10*3/uL    Immature Granulocytes Absolute, Automated 0.01 0.00 - 0.70 x10*3/uL    Lymphocytes Absolute 0.63 (L) 1.20 - 4.80 x10*3/uL    Monocytes Absolute 0.07 (L) 0.10 - 1.00 x10*3/uL    Eosinophils Absolute 0.00 0.00 - 0.70 x10*3/uL    Basophils Absolute 0.00 0.00 - 0.10 x10*3/uL   Comprehensive metabolic panel   Result Value Ref Range    Glucose 164 (H) 74 - 99 mg/dL    Sodium 137 136 - 145 mmol/L    Potassium 4.4 3.5 - 5.3 mmol/L    Chloride 103 98 - 107 mmol/L    Bicarbonate 23 21 - 32 mmol/L    Anion Gap 15 10 - 20 mmol/L    Urea Nitrogen 11 6 - 23 mg/dL    Creatinine 0.71 0.50 - 1.30 mg/dL    eGFR >90 >60 mL/min/1.73m*2    Calcium 9.4 8.6 - 10.3 mg/dL    Albumin  3.8 3.4 - 5.0 g/dL    Alkaline Phosphatase 68 33 - 120 U/L    Total Protein 7.5 6.4 - 8.2 g/dL    AST 34 9 - 39 U/L    Bilirubin, Total 0.3 0.0 - 1.2 mg/dL    ALT 34 10 - 52 U/L   TSH with reflex to Free T4 if abnormal   Result Value Ref Range    Thyroid Stimulating Hormone 0.15 (L) 0.44 - 3.98 mIU/L   Thyroxine, Free   Result Value Ref Range    Thyroxine, Free 1.00 0.61 - 1.12 ng/dL   Morphology   Result Value Ref Range    RBC Morphology No significant RBC morphology present    CT angio chest for pulmonary embolism    Result Date: 10/25/2024  Interpreted By:  Andrew Leos, STUDY: CT ANGIO CHEST FOR PULMONARY EMBOLISM;  10/25/2024 10:29 pm   INDICATION: Signs/Symptoms:tachycardia, hypoxia, elevated dimer.   COMPARISON: And a left hilar lymph node measures 1.3 cm short axis.   ACCESSION NUMBER(S): [id number]   ORDERING CLINICIAN: SAVANNAH ARIAS   TECHNIQUE: Helical data acquisition of the chest was obtained after the administration of intravenous contrast, 75 mL Omnipaque 350. Images were reformatted in axial, coronal, and sagittal planes.  MIP reconstructions were performed on a separate workstation and provided for interpretation.   FINDINGS: LOWER NECK AND CHEST WALL:  1.7 cm right thyroid nodule. Mild gynecomastia.   MEDIASTINUM/TAINA:Mediastinal and hilar lymph node enlargement, for example a left lower paratracheal lymph node measures 1.5 cm short axis, and a right hilar lymph node measures 1.3 cm short axis. Esophagus is within normal limits.   CARDIOVASCULAR:  Cardiac chamber size within normal limits. No pericardial effusion. 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 Normal caliber of main pulmonary artery.No central pulmonary emboli. Subsegmental pulmonary arteries are suboptimally evaluated to suboptimal opacification.   LUNGS, AIRWAYS, AND PLEURA:  Extensive consolidative and peribronchovascular ground-glass opacities most pronounced within the right greater than left lung bases  and to a lesser degree the left-greater-than-right upper lobes. Scattered endobronchial material. Motion artifact limits evaluation of the lungs. The trachea and central airways are patent.   MUSCULOSKELETAL: No acute osseous abnormality or suspicious osseous lesions.   UPPER ABDOMEN: Unremarkable.       1. No central pulmonary emboli. Subsegmental pulmonary arteries are suboptimally evaluated to suboptimal opacification. 2. Extensive consolidative and peribronchovascular ground-glass opacities most pronounced within the right greater than left lung bases and to a lesser degree the left-greater-than-right upper lobes. The findings are concerning for multifocal pneumonia. Endobronchial debris raises the possibility of aspiration. 3. Mediastinal and hilar lymph node enlargement, likely reactive. 4. 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 5.  1.7 cm right thyroid nodule. Follow-up thyroid ultrasound is recommended.   Signed by: Andrew Leos 10/25/2024 11:36 PM Dictation workstation:   [id number]    XR chest 2 views    Result Date: 10/25/2024  Interpreted By:  Chantal Sterling, STUDY: Chest, 2 views.   INDICATION: Signs/Symptoms:cough, fever.   COMPARISON: None.   ACCESSION NUMBER(S): [id number]   ORDERING CLINICIAN: JAIDEN HECK   FINDINGS: The cardiomediastinal silhouette size is within normal limits.   Patchy opacities are visualized in the right lower lung zone as well as in the left upper lung zone in the suprahilar region.   No sizeable pleural effusion.       1. Findings of pneumonia of the right lung base as well as left upper lung suprahilar region.   MACRO: None.   Signed by: Chantal Sterling 10/25/2024 8:08 PM Dictation workstation:   [id number]        Assessment/Plan        Assessment & Plan  Acute hypoxic respiratory failure (Multi)  -Secondary to bilateral multifocal pneumonia,   -Continue oxygen via nasal cannula currently on 2.5 L  -Sinew IV antibiotics and IV  corticosteroids  -Pulmonary medicine consulted  Multifocal pneumonia  Hypoxic with SOB on presentation, flu A, flu B, RSV, COVID-negative, chest x-ray with lower lung field and left lung pneumonia.  CTA chest negative for PE but concerns for multifocal pneumonia, mediastinal and hilar lymph node likely reactive.  -Continue IV antibiotics status post azithromycin and Rocephin in the ED, currently on Doxy and Zosyn  -Follow sputum and blood cultures  -Bronchodilators/DuoNebs as needed  -Continue low-dose IV corticosteroids  -Medicine consult pending  -Pneumonia labs pending  Sinus tachycardia  Likely secondary to dehydration from diarrhea, resolved status post 500 cc IV bolus in the emergency department.  Mediastinal lymphadenopathy  Likely reactive secondary to above, follow-up with PCP to repeat imaging.  Hilar lymphadenopathy  Same as above  Thyroid nodule  Incidental finding 1.7 cm thyroid nodule.  TSH 0.15, free T41.0  Thyroid ultrasound completed, patient should have 1 year follow-up ultrasound  Aortic ectasia, thoracic (CMS-HCC)  Incidental finding on CT imaging, follow-up outpatient with PCP    DVT prophylaxis: Lovenox subcu    Disposition: Discharge home pending clinical improvement.  Also pending pulmonary medicine consult, evaluation of final recommendation.    I spent 35 minutes in the professional in overall care of this patient.    Jose Bates MD

## 2024-10-26 NOTE — ASSESSMENT & PLAN NOTE
Incidental finding 1.7 cm thyroid nodule.  TSH 0.15, free T41.0  Thyroid ultrasound completed, patient should have 1 year follow-up ultrasound

## 2024-10-26 NOTE — CARE PLAN
The patient's goals for the shift include Rest    The clinical goals for the shift include monitor o2

## 2024-10-26 NOTE — ASSESSMENT & PLAN NOTE
Hypoxic with SOB on presentation, flu A, flu B, RSV, COVID-negative, chest x-ray with lower lung field and left lung pneumonia.  CTA chest negative for PE but concerns for multifocal pneumonia, mediastinal and hilar lymph node likely reactive.  -Continue IV antibiotics status post azithromycin and Rocephin in the ED, currently on Doxy and Zosyn  -Follow sputum and blood cultures  -Bronchodilators/DuoNebs as needed  -Continue low-dose IV corticosteroids  -Medicine consult pending  -Pneumonia labs pending

## 2024-10-26 NOTE — ED PROVIDER NOTES
HPI   Chief Complaint   Patient presents with    Shortness of Breath     Patient has been experiencing SOB for a week and couple days now, patient was feeling worse and checked into urgent care , had an SPO2 of 88% on RA.        Patient is a 34-year-old male with no significant past med history presenting with shortness of breath x 1 week via EMS.  Reportedly patient went to the urgent care clinic shortness of breath and was found to be hypoxic on room air.  Patient states that he has been having intermittent symptoms of shortness of breath and states yesterday he was feeling improved but today felt significantly worse.  Denies any sick contacts.  Feels as if his heart is racing.  Patient denies fevers, chills, cough, sore throat, runny nose, chest pain, abdominal pain, nausea, vomiting, diarrhea or urinary complaints.              Patient History   No past medical history on file.  No past surgical history on file.  No family history on file.  Social History     Tobacco Use    Smoking status: Never    Smokeless tobacco: Never   Substance Use Topics    Alcohol use: Not on file    Drug use: Never       Physical Exam   ED Triage Vitals [10/25/24 2015]   Temperature Heart Rate Respirations BP   37.9 °C (100.2 °F) (!) 128 (!) 24 137/85      Pulse Ox Temp Source Heart Rate Source Patient Position   97 % Temporal Monitor Lying      BP Location FiO2 (%)     Left arm --       Physical Exam  Vitals and nursing note reviewed.   Constitutional:       Appearance: He is well-developed.      Comments: Awake, sitting in examination bed   HENT:      Head: Normocephalic and atraumatic.      Mouth/Throat:      Mouth: Mucous membranes are moist.      Pharynx: Oropharynx is clear.   Eyes:      Extraocular Movements: Extraocular movements intact.      Conjunctiva/sclera: Conjunctivae normal.      Pupils: Pupils are equal, round, and reactive to light.   Cardiovascular:      Rate and Rhythm: Regular rhythm. Tachycardia present.       Heart sounds: No murmur heard.  Pulmonary:      Effort: Pulmonary effort is normal. No respiratory distress.      Breath sounds: Normal breath sounds. No decreased breath sounds or wheezing.   Chest:      Chest wall: No tenderness.   Abdominal:      Palpations: Abdomen is soft.      Tenderness: There is no abdominal tenderness.   Musculoskeletal:         General: No swelling. Normal range of motion.      Cervical back: Normal range of motion and neck supple.   Skin:     General: Skin is warm and dry.      Capillary Refill: Capillary refill takes less than 2 seconds.   Neurological:      General: No focal deficit present.      Mental Status: He is alert and oriented to person, place, and time.   Psychiatric:         Mood and Affect: Mood normal.         Behavior: Behavior normal.           ED Course & MDM   ED Course as of 10/25/24 2354   Fri Oct 25, 2024   2052 EKG interpreted by myself independently, EKG shows sinus tachycardia, rate 100 beats minute, parable 150, QRS 90, , QTc 415, patient has no ST elevations or depressions, negative for acute MI [MARITZA]   2102 Chest x-ray done in urgent care, shows pneumonia.  No need to repeat at this time. [AR]      ED Course User Index  [AR] Richard Deras PA-C  [MARITZA] Hernan Morton,          Diagnoses as of 10/25/24 2354   Multifocal pneumonia   Acute respiratory failure with hypoxia (Multi)                 No data recorded     [place] Coma Scale Score: 15 (10/25/24 2047 : Wayne Perales, ALDA)                           Medical Decision Making  Patient is a 34-year-old male with no significant past medical history presenting with shortness of breath x 1 week via EMS.  Patient currently on 2 L nasal cannula.  Lab work, fluids, imaging ordered.  Conditions considered include but are not limited to: Viral illness, pneumonia, PE.    Reports that patient went to urgent care earlier today and was subsequently diagnosed with pneumonia but sent here due to hypoxia.  And due  to patient's tachycardia, hypoxia, respirations, septic protocol ordered.  Rocephin and azithromycin ordered.    CBC is without leukocytosis or anemia.  CMP without significant electrolyte abnormality or renal impairment.  D-dimer is elevated so CT PE ordered.  CT PE does show signs of bilateral pneumonia.  COVID is negative.  Influenza is negative.  Magnesium within normal limits.    I spoke with admitting provider, Dr. Aquino.  He is recommended to order an RSV swab and states that he will have this patient admitted.  As I deemed necessary from the patient's history, physical, laboratory and imaging findings as well as ED course, I considered the above listed diagnoses.    Upon my evaluation, this patient had a high probability of imminent or life threatening deterioration due to bilateral pneumonia causing hypoxia, which required my direct attention, intervention, and personal management.    I personally provided ___ minutes to nonconcurrent critical care time exclusive of time spent on separately billable procedures.  Time includes review of laboratory data, radiology results, discussion with consultants, and monitoring for potential decompensation.  Interventions were performed as documented above.    Portions of this note made with Dragon software, please be mindful of potential grammatical errors.        Medications   ipratropium-albuteroL (Duo-Neb) 0.5-2.5 mg/3 mL nebulizer solution 3 mL (has no administration in time range)   methylPREDNISolone sod succinate (SOLU-Medrol) 40 mg/mL injection 40 mg (has no administration in time range)   sodium chloride 0.9 % bolus 500 mL (0 mL intravenous Stopped 10/25/24 2148)   cefTRIAXone (Rocephin) 2 g in dextrose (iso) IV 50 mL (0 g intravenous Stopped 10/25/24 2148)   azithromycin 500 mg in dextrose 5% 250 mL IV (500 mg intravenous New Bag 10/25/24 2107)   iohexol (OMNIPaque) 350 mg iodine/mL solution 75 mL (75 mL intravenous Given 10/25/24 2236)         Labs Reviewed    COMPREHENSIVE METABOLIC PANEL - Abnormal       Result Value    Glucose 142 (*)     Sodium 135 (*)     Potassium 3.8      Chloride 99      Bicarbonate 27      Anion Gap 13      Urea Nitrogen 9      Creatinine 0.89      eGFR >90      Calcium 9.1      Albumin 4.1      Alkaline Phosphatase 64      Total Protein 7.6      AST 19      Bilirubin, Total 0.4      ALT 22     D-DIMER, NON VTE - Abnormal    D-Dimer Non VTE, Quant (mg/L FEU) 0.54 (*)     Narrative:     THROMBOEMBOLIC EVENTS CANNOT BE EXCLUDED SOLELY ON THE BASIS OF THE D-DIMER LEVEL BEING WITHIN THE NORMAL REFERENCE RANGE. D-DIMER LEVELS LESS THAN 0.5 MG/L FEU IN CONJUNCTION WITH A LOW CLINICAL PROBABILITY HAVE AN EXCELLENT NEGATIVE PREDICTIVE VALUE IN EXCLUDING A DIAGNOSIS OF PULMONARY EMBOLUS (PE) OR DEEP VEIN THROMBOSIS (DVT). ELEVATED D-DIMER LEVELS ARE NOT SPECIFIC TO PE OR DVT, AND MAY BE SEEN IN PATIENTS WITH DIC, ADVANCED AGE, PREGNANCY, MALIGNANCY, LIVER DISEASE, INFECTION, AND INFLAMMATORY CONDITIONS AMONG OTHERS. D-DIMER LEVELS MAY BE DECREASED IN PATIENTS RECEIVING ANTI-COAGULATION THERAPY.   MAGNESIUM - Normal    Magnesium 1.90     SARS-COV-2 PCR - Normal    Coronavirus 2019, PCR Not Detected      Narrative:     This assay has received FDA Emergency Use Authorization (EUA) and is only authorized for the duration of time that circumstances exist to justify the authorization of the emergency use of in vitro diagnostic tests for the detection of SARS-CoV-2 virus and/or diagnosis of COVID-19 infection under section 564(b)(1) of the Act, 21 U.S.C. 360bbb-3(b)(1). This assay is an in vitro diagnostic nucleic acid amplification test for the qualitative detection of SARS-CoV-2 from nasopharyngeal specimens and has been validated for use at [facility]. Negative results do not preclude COVID-19 infections and should not be used as the sole basis for diagnosis, treatment, or other management decisions.     INFLUENZA A AND B PCR -  Normal    Flu A Result Not Detected      Flu B Result Not Detected      Narrative:     This assay is an in vitro diagnostic multiplex nucleic acid amplification test for the detection and discrimination of Influenza A & B from nasopharyngeal specimens, and has been validated for use at [facility]. Negative results do not preclude Influenza A/B infections, and should not be used as the sole basis for diagnosis, treatment, or other management decisions. If Influenza A/B and RSV PCR results are negative, testing for Parainfluenza virus, Adenovirus and Metapneumovirus is routinely performed for [facility] pediatric oncology and intensive care inpatients, and is available on other patients by placing an add-on request.   SERIAL TROPONIN-INITIAL - Normal    Troponin I, High Sensitivity 3      Narrative:     Less than 99th percentile of normal range cutoff-  Female and children under 18 years old <14 ng/L; Male <21 ng/L: Negative  Repeat testing should be performed if clinically indicated.     Female and children under 18 years old 14-50 ng/L; Male 21-50 ng/L:  Consistent with possible cardiac damage and possible increased clinical   risk. Serial measurements may help to assess extent of myocardial damage.     >50 ng/L: Consistent with cardiac damage, increased clinical risk and  myocardial infarction. Serial measurements may help assess extent of   myocardial damage.      NOTE: Children less than 1 year old may have higher baseline troponin   levels and results should be interpreted in conjunction with the overall   clinical context.     NOTE: Troponin I testing is performed using a different   testing methodology at [facility] than at other   [facility]. Direct result comparisons should only   be made within the same method.   SERIAL TROPONIN, 1 HOUR - Normal    Troponin I, High Sensitivity <3      Narrative:     Less than 99th percentile of normal range cutoff-  Female and children  under 18 years old <14 ng/L; Male <21 ng/L: Negative  Repeat testing should be performed if clinically indicated.     Female and children under 18 years old 14-50 ng/L; Male 21-50 ng/L:  Consistent with possible cardiac damage and possible increased clinical   risk. Serial measurements may help to assess extent of myocardial damage.     >50 ng/L: Consistent with cardiac damage, increased clinical risk and  myocardial infarction. Serial measurements may help assess extent of   myocardial damage.      NOTE: Children less than 1 year old may have higher baseline troponin   levels and results should be interpreted in conjunction with the overall   clinical context.     NOTE: Troponin I testing is performed using a different   testing methodology at [facility] than at other   [facility]. Direct result comparisons should only   be made within the same method.   LACTATE - Normal    Lactate 1.1      Narrative:     Venipuncture immediately after or during the administration of Metamizole may lead to falsely low results. Testing should be performed immediately prior to Metamizole dosing.   RSV PCR - Normal    RSV PCR Not Detected      Narrative:     This assay is an FDA-cleared, in vitro diagnostic nucleic acid amplification test for the detection of RSV from nasopharyngeal specimens, and has been validated for use at [facility]. Negative results do not preclude RSV infections, and should not be used as the sole basis for diagnosis, treatment, or other management decisions. If Influenza A/B and RSV PCR results are negative, testing for Parainfluenza virus, Adenovirus and Metapneumovirus is routinely performed for pediatric oncology and intensive care inpatients at [facility], and is available on other patients by placing an add-on request.       BLOOD CULTURE   BLOOD CULTURE   STREPTOCOCCUS PNEUMONIAE ANTIGEN, URINE   LEGIONELLA ANTIGEN, URINE   TROPONIN SERIES- (INITIAL, 1 HR)     Narrative:     The following orders were created for panel order Troponin I Series, High Sensitivity (0, 1 HR).  Procedure                               Abnormality         Status                     ---------                               -----------         ------                     Troponin I, High Sensiti...[[id number]]  Normal              Final result               Troponin, High Sensitivi...[[id number]]  Normal              Final result                 Please view results for these tests on the individual orders.   CBC WITH AUTO DIFFERENTIAL    WBC 7.2      nRBC 0.0      RBC 4.86      Hemoglobin 14.1      Hematocrit 41.7      MCV 86      MCH 29.0      MCHC 33.8      RDW 11.7      Platelets 286      Neutrophils % 67.6      Immature Granulocytes %, Automated 0.3      Lymphocytes % 25.4      Monocytes % 6.1      Eosinophils % 0.3      Basophils % 0.3      Neutrophils Absolute 4.84      Immature Granulocytes Absolute, Automated 0.02      Lymphocytes Absolute 1.82      Monocytes Absolute 0.44      Eosinophils Absolute 0.02      Basophils Absolute 0.02     URINALYSIS WITH REFLEX CULTURE AND MICROSCOPIC    Narrative:     The following orders were created for panel order Urinalysis with Reflex Culture and Microscopic.  Procedure                               Abnormality         Status                     ---------                               -----------         ------                     Urinalysis with Reflex C...[[id number]]                                                 Extra Urine Gray Tube[[id number]]                                                         Please view results for these tests on the individual orders.   URINALYSIS WITH REFLEX CULTURE AND MICROSCOPIC   EXTRA URINE GRAY TUBE   RSV PCR   URINALYSIS WITH REFLEX CULTURE AND MICROSCOPIC    Narrative:     The following orders were created for panel order Urinalysis with Reflex Culture and Microscopic.  Procedure                               Abnormality          Status                     ---------                               -----------         ------                     Urinalysis with Reflex C...[[id number]]                                                 Extra Urine Gray Tube[[id number]]                                                         Please view results for these tests on the individual orders.   URINALYSIS WITH REFLEX CULTURE AND MICROSCOPIC   EXTRA URINE GRAY TUBE         CT angio chest for pulmonary embolism   Final Result   1. No central pulmonary emboli. Subsegmental pulmonary arteries are   suboptimally evaluated to suboptimal opacification.   2. Extensive consolidative and peribronchovascular ground-glass   opacities most pronounced within the right greater than left lung   bases and to a lesser degree the left-greater-than-right upper lobes.   The findings are concerning for multifocal pneumonia. Endobronchial   debris raises the possibility of aspiration.   3. Mediastinal and hilar lymph node enlargement, likely reactive.   4. 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   5.  1.7 cm right thyroid nodule. Follow-up thyroid ultrasound is   recommended.        Signed by: Andrew Leos 10/25/2024 11:36 PM   Dictation workstation:   [id number]            Procedure  Procedures     Richard Deras PA-C  10/25/24 1525       Richard Deras PA-C  10/25/24 2359

## 2024-10-27 VITALS
TEMPERATURE: 97.9 F | HEART RATE: 99 BPM | OXYGEN SATURATION: 97 % | WEIGHT: 119.93 LBS | BODY MASS INDEX: 17.76 KG/M2 | RESPIRATION RATE: 17 BRPM | HEIGHT: 69 IN | DIASTOLIC BLOOD PRESSURE: 76 MMHG | SYSTOLIC BLOOD PRESSURE: 127 MMHG

## 2024-10-27 LAB
BACTERIA BLD CULT: NORMAL
BACTERIA BLD CULT: NORMAL
BACTERIA SPEC RESP CULT: NORMAL
GRAM STN SPEC: NORMAL
GRAM STN SPEC: NORMAL

## 2024-10-27 PROCEDURE — 2500000004 HC RX 250 GENERAL PHARMACY W/ HCPCS (ALT 636 FOR OP/ED): Performed by: INTERNAL MEDICINE

## 2024-10-27 PROCEDURE — 99232 SBSQ HOSP IP/OBS MODERATE 35: CPT | Performed by: NURSE PRACTITIONER

## 2024-10-27 PROCEDURE — 2500000001 HC RX 250 WO HCPCS SELF ADMINISTERED DRUGS (ALT 637 FOR MEDICARE OP): Performed by: INTERNAL MEDICINE

## 2024-10-27 RX ORDER — ALBUTEROL SULFATE 90 UG/1
2 INHALANT RESPIRATORY (INHALATION) EVERY 6 HOURS PRN
Qty: 8 G | Refills: 0 | Status: SHIPPED | OUTPATIENT
Start: 2024-10-27

## 2024-10-27 RX ORDER — GUAIFENESIN/DEXTROMETHORPHAN 100-10MG/5
5 SYRUP ORAL EVERY 4 HOURS PRN
Qty: 118 ML | Refills: 0 | Status: SHIPPED | OUTPATIENT
Start: 2024-10-27

## 2024-10-27 RX ORDER — DOXYCYCLINE 100 MG/1
100 CAPSULE ORAL 2 TIMES DAILY
Qty: 10 CAPSULE | Refills: 0 | Status: SHIPPED | OUTPATIENT
Start: 2024-10-27 | End: 2024-11-01

## 2024-10-27 RX ORDER — AMOXICILLIN AND CLAVULANATE POTASSIUM 875; 125 MG/1; MG/1
1 TABLET, FILM COATED ORAL 2 TIMES DAILY
Qty: 10 TABLET | Refills: 0 | Status: SHIPPED | OUTPATIENT
Start: 2024-10-27 | End: 2024-11-01

## 2024-10-27 ASSESSMENT — COGNITIVE AND FUNCTIONAL STATUS - GENERAL
DAILY ACTIVITIY SCORE: 24
MOBILITY SCORE: 24

## 2024-10-27 ASSESSMENT — PAIN SCALES - GENERAL
PAINLEVEL_OUTOF10: 0 - NO PAIN
PAINLEVEL_OUTOF10: 0 - NO PAIN

## 2024-10-27 ASSESSMENT — PAIN - FUNCTIONAL ASSESSMENT: PAIN_FUNCTIONAL_ASSESSMENT: 0-10

## 2024-10-27 NOTE — CARE PLAN
Problem: Safety - Adult  Goal: Free from fall injury  Outcome: Progressing     Problem: Discharge Planning  Goal: Discharge to home or other facility with appropriate resources  Outcome: Progressing     Problem: Chronic Conditions and Co-morbidities  Goal: Patient's chronic conditions and co-morbidity symptoms are monitored and maintained or improved  Outcome: Progressing     Problem: Pain  Goal: Takes deep breaths with improved pain control throughout the shift  Outcome: Progressing  Goal: Turns in bed with improved pain control throughout the shift  Outcome: Progressing  Goal: Walks with improved pain control throughout the shift  Outcome: Progressing  Goal: Performs ADL's with improved pain control throughout shift  Outcome: Progressing  Goal: Participates in PT with improved pain control throughout the shift  Outcome: Progressing  Goal: Free from opioid side effects throughout the shift  Outcome: Progressing  Goal: Free from acute confusion related to pain meds throughout the shift  Outcome: Progressing   The patient's goals for the shift include Rest    The clinical goals for the shift include Monitor O2    Over the shift, the patient did not make progress toward the following goals. Barriers to progression include oxygen requirement. Recommendations to address these barriers include monitor oxygen, IV antibiotics.

## 2024-10-27 NOTE — DISCHARGE SUMMARY
Discharge Diagnosis  Acute hypoxic respiratory failure (Multi)    Issues Requiring Follow-Up  Aortic ectasia   Mediastinal and hilar lymphadenopathy  Thyroid nodule (1 year follow-up ultrasound)    Discharge Meds     Medication List      START taking these medications     albuterol 90 mcg/actuation inhaler; Commonly known as: Ventolin HFA;   Inhale 2 puffs every 6 hours if needed for wheezing or shortness of   breath.   amoxicillin-pot clavulanate 875-125 mg tablet; Commonly known as:   Augmentin; Take 1 tablet by mouth 2 times a day for 5 days.   dextromethorphan-guaifenesin  mg/5 mL oral liquid; Commonly known   as: Robitussin DM; Take 5 mL by mouth every 4 hours if needed for cough.   doxycycline 100 mg capsule; Commonly known as: Vibramycin; Take 1   capsule (100 mg) by mouth 2 times a day for 5 days. Take with at least 8   ounces (large glass) of water, do not lie down for 30 minutes after       Test Results Pending At Discharge  Pending Labs       Order Current Status    Legionella Antigen, Urine In process    Streptococcus pneumoniae Antigen, Urine In process    Blood Culture Preliminary result    Blood Culture Preliminary result    Respiratory Culture/Smear Preliminary result            Hospital Course   Diogo Carrizales is a 34 y.o. who presented initially with Shortness of Breath (Patient has been experiencing SOB for a week and couple days now, patient was feeling worse and checked into urgent care , had an SPO2 of 88% on RA. )  He was treated for multifocal pneumonia and acute hypoxic respiratory failure. He was treated with IV Zosyn and doxycycline. He was on room air and comfortable walking around without oxygen 10/27. He was seen by pulmonology and speech therapy while inpatient. Speech without concern for aspiration. Incidentally on imaging, he was noted to have hilar and mediastinal lymphadenopathy as well as aortic ectasia and thyroid nodule on CTA. Thyroid US performed inpatient and  follow-up US in 1 year recommended. He has instructions to follow-up outpatient.  This patient was discharged in improved condition. 38 minutes spent performing discharge services      Pertinent Physical Exam At Time of Discharge  Visit Vitals  /72 (BP Location: Right arm, Patient Position: Lying)   Pulse 86   Temp 37 °C (98.6 °F) (Temporal)   Resp 18     Vitals Reviewed: yes  Constitutional: Alert, no acute distress  Eyes: EOMI, normal conjunctiva  ENT: Moist mucus membranes, airway patent  Neck: Supple, normal ROM, no lymphadenopathy  Pulm: Diminished basilar lung sounds with scattered mild crackles in upper lobes  Cardiac: regular rate, regular rhythm, no murmur, equal pulses  MSK: no lower extremity edema  Skin: Warm and well perfused  Neuro: Oriented, no focal deficit, CN II-XII grossly intact  Psych: Cooperative, appropriate affect, normal judgement    US thyroid  Result Date: 10/26/2024  1. Thyroid nodules as described above with follow up ultrasound in 1 year to document stability as described in the right lobe of the thyroid.    CT angio chest for pulmonary embolism  Result Date: 10/25/2024  1. No central pulmonary emboli. Subsegmental pulmonary arteries are suboptimally evaluated to suboptimal opacification. 2. Extensive consolidative and peribronchovascular ground-glass opacities most pronounced within the right greater than left lung bases and to a lesser degree the left-greater-than-right upper lobes. The findings are concerning for multifocal pneumonia. Endobronchial debris raises the possibility of aspiration. 3. Mediastinal and hilar lymph node enlargement, likely reactive. 4. 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 5.  1.7 cm right thyroid nodule. Follow-up thyroid ultrasound is recommended.     XR chest 2 views  Result Date: 10/25/2024  1. Findings of pneumonia of the right lung base as well as left upper lung suprahilar region.    Outpatient Follow-Up  No future  appointments.      Thuy Chaves MD

## 2024-10-27 NOTE — ASSESSMENT & PLAN NOTE
-resolved, was likely secondary to dehydration from diarrhea, resolved status post 500 cc IV bolus in the emergency department.

## 2024-10-27 NOTE — PROGRESS NOTES
"Diogo Carrizales is a 34 y.o. male on day 2 of admission seen in follow-up for multifocal pneumonia    Subjective   On room air; O2 sats 93%.  Endorses  improved breathing and productive cough.  Denies pain.  Afebrile.       Objective     Physical Exam  Vitals and nursing note reviewed.   Constitutional:       Appearance: Normal appearance.   HENT:      Head: Normocephalic and atraumatic.      Nose: Nose normal.      Mouth/Throat:      Mouth: Mucous membranes are moist.   Eyes:      Extraocular Movements: Extraocular movements intact.      Conjunctiva/sclera: Conjunctivae normal.      Pupils: Pupils are equal, round, and reactive to light.   Cardiovascular:      Rate and Rhythm: Normal rate and regular rhythm.      Pulses: Normal pulses.      Heart sounds: Normal heart sounds.   Pulmonary:      Effort: Pulmonary effort is normal.      Comments: Lungs diminished but clear.  Abdominal:      General: Bowel sounds are normal.      Palpations: Abdomen is soft.   Musculoskeletal:         General: Normal range of motion.   Skin:     General: Skin is warm and dry.      Capillary Refill: Capillary refill takes less than 2 seconds.   Neurological:      General: No focal deficit present.      Mental Status: He is alert and oriented to person, place, and time.   Psychiatric:         Mood and Affect: Mood normal.         Behavior: Behavior normal.         Last Recorded Vitals  Blood pressure 119/76, pulse 81, temperature 36.8 °C (98.2 °F), temperature source Temporal, resp. rate 17, height 1.753 m (5' 9\"), weight 54.4 kg (119 lb 14.9 oz), SpO2 93%.    Intake/Output Summary (Last 24 hours) at 10/27/2024 1001  Last data filed at 10/27/2024 0752  Gross per 24 hour   Intake 2100 ml   Output --   Net 2100 ml      Scheduled medications:  doxycycline, 100 mg, intravenous, q12h  enoxaparin, 40 mg, subcutaneous, Daily  methylPREDNISolone sodium succinate (PF), 40 mg, intravenous, q12h  piperacillin-tazobactam, 3.375 g, intravenous, " q6h    PRN medications: acetaminophen **OR** acetaminophen **OR** acetaminophen, benzocaine-menthol, dextromethorphan-guaifenesin, guaiFENesin, ipratropium-albuteroL, ondansetron ODT **OR** ondansetron, polyethylene glycol       Relevant Results  Results for orders placed or performed during the hospital encounter of 10/25/24 (from the past 24 hours)   Urinalysis with Reflex Culture and Microscopic   Result Value Ref Range    Color, Urine Colorless (N) Light-Yellow, Yellow, Dark-Yellow    Appearance, Urine Clear Clear    Specific Gravity, Urine 1.008 1.005 - 1.035    pH, Urine 6.5 5.0, 5.5, 6.0, 6.5, 7.0, 7.5, 8.0    Protein, Urine NEGATIVE NEGATIVE, 10 (TRACE), 20 (TRACE) mg/dL    Glucose, Urine Normal Normal mg/dL    Blood, Urine NEGATIVE NEGATIVE    Ketones, Urine NEGATIVE NEGATIVE mg/dL    Bilirubin, Urine NEGATIVE NEGATIVE    Urobilinogen, Urine Normal Normal mg/dL    Nitrite, Urine NEGATIVE NEGATIVE    Leukocyte Esterase, Urine NEGATIVE NEGATIVE   RSV PCR   Result Value Ref Range    RSV PCR Not Detected Not Detected      US thyroid  Result Date: 10/26/2024  FINDINGS:   Right lobe of the thyroid: 5.9 cm x 2.1 cm x 1.9 cm cm. Right lobe of the thyroid demonstrates mild heterogeneous echotexture. There is a few solid nodules within the right lobe of the thyroid inferiorly. For example, mildly hyperechoic nodule identified measuring 1.1 x 0.9 x 0.7 cm with TI-RADS score of 4 and given size of the nodule follow-up ultrasound in 1 year. Also, there is a adjacent solid isoechoic nodule identified measuring 1.5 x 1.4 x 1.6 cm with TI-RADS score of 3 and given size and nodule follow-up ultrasound in 1 year to document stability.   Left lobe of the thyroid: 5.7 cm x 2.0 cm x 1.5 cm cm. Left lobe of the thyroid demonstrates normal echogenicity. There is a solid nodule identified within the mid left lobe of the thyroid posteriorly measuring 0.6 x 0.6 x 0.7 cm with TI-RADS score of 3. No further follow-up necessary..    Thyroid isthmus: 0.2 cm cm. Unremarkable. 1. Thyroid nodules as described above with follow up ultrasound in 1 year to document stability as described in the right lobe of the thyroid.     CT angio chest for pulmonary embolism  Result Date: 10/25/2024  FINDINGS: LOWER NECK AND CHEST WALL:  1.7 cm right thyroid nodule. Mild gynecomastia.   MEDIASTINUM/TAINA:Mediastinal and hilar lymph node enlargement, for example a left lower paratracheal lymph node measures 1.5 cm short axis, and a right hilar lymph node measures 1.3 cm short axis. Esophagus is within normal limits.   CARDIOVASCULAR:  Cardiac chamber size within normal limits. No pericardial effusion. 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 Normal caliber of main pulmonary artery.No central pulmonary emboli. Subsegmental pulmonary arteries are suboptimally evaluated to suboptimal opacification.   LUNGS, AIRWAYS, AND PLEURA:  Extensive consolidative and peribronchovascular ground-glass opacities most pronounced within the right greater than left lung bases and to a lesser degree the left-greater-than-right upper lobes. Scattered endobronchial material. Motion artifact limits evaluation of the lungs. The trachea and central airways are patent.   MUSCULOSKELETAL: No acute osseous abnormality or suspicious osseous lesions.   UPPER ABDOMEN: Unremarkable.  1. No central pulmonary emboli. Subsegmental pulmonary arteries are suboptimally evaluated to suboptimal opacification. 2. Extensive consolidative and peribronchovascular ground-glass opacities most pronounced within the right greater than left lung bases and to a lesser degree the left-greater-than-right upper lobes. The findings are concerning for multifocal pneumonia. Endobronchial debris raises the possibility of aspiration. 3. Mediastinal and hilar lymph node enlargement, likely reactive. 4. 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 5.  1.7 cm right thyroid nodule. Follow-up thyroid  ultrasound is recommended.       XR chest 2 views  Result Date: 10/25/2024  FINDINGS: The cardiomediastinal silhouette size is within normal limits.   Patchy opacities are visualized in the right lower lung zone as well as in the left upper lung zone in the suprahilar region.   No sizeable pleural effusion. 1. Findings of pneumonia of the right lung base as well as left upper lung suprahilar region.      Assessment/Plan   Acute hypoxic respiratory failure  Stable on room air  Home O2 certification prior to discharge  Continue nebulized bronchodilator   Continuous pulse oximetry  Incentive spirometry/pulmonary hygiene     Multifocal pneumonia  Continue IV doxycycline, Zosyn   Urine Legionella, strep pneumo pending  Follow-up cultures  IV Solu-Medrol completed  Regular diet, thin liquids per ST     Mediastinal/Hilar Lymphadenopathy   Likely reactive  Follow-up CT scan chest as outpatient     Thyroid nodule  Follow-up TSH  Follow-up thyroid US in 1 year to document stability     Prophylaxis  Subcutaneous Lovenox    PT/OT/out of bed    Fiorella Toscano, APRN-CNP

## 2024-10-27 NOTE — ASSESSMENT & PLAN NOTE
-Covid, flu, RSV negative  -s/p azithromycin/Rocephin in the ED  -continue Doxy and Zosyn  -Follow sputum and blood cultures  -Bronchodilators/DuoNebs as needed  -Continue low-dose IV corticosteroids  -Pneumonia labs pending

## 2024-10-27 NOTE — ASSESSMENT & PLAN NOTE
Incidental finding 1.7 cm thyroid nodule.  TSH=0.15, free T4 = 1.0  Thyroid ultrasound completed, patient should have 1 year follow-up ultrasound

## 2024-10-27 NOTE — ASSESSMENT & PLAN NOTE
-Secondary to bilateral multifocal pneumonia  -IV antibiotics and IV corticosteroids  -Pulmonary medicine consulted  -wean O2 as tolerated

## 2024-10-28 LAB
BACTERIA SPEC RESP CULT: NORMAL
GRAM STN SPEC: NORMAL
GRAM STN SPEC: NORMAL
LEGIONELLA AG UR QL: NEGATIVE
S PNEUM AG UR QL: NEGATIVE

## 2024-10-30 ENCOUNTER — TELEPHONE (OUTPATIENT)
Dept: INPATIENT UNIT | Facility: HOSPITAL | Age: 34
End: 2024-10-30

## 2024-10-30 LAB
ATRIAL RATE: 127 BPM
BACTERIA BLD CULT: NORMAL
BACTERIA BLD CULT: NORMAL
P AXIS: 37 DEGREES
P OFFSET: 199 MS
P ONSET: 147 MS
PR INTERVAL: 150 MS
Q ONSET: 222 MS
QRS COUNT: 20 BEATS
QRS DURATION: 90 MS
QT INTERVAL: 310 MS
QTC CALCULATION(BAZETT): 450 MS
QTC FREDERICIA: 398 MS
R AXIS: 99 DEGREES
T AXIS: -13 DEGREES
T OFFSET: 377 MS
VENTRICULAR RATE: 127 BPM
